# Patient Record
Sex: MALE | Race: WHITE
[De-identification: names, ages, dates, MRNs, and addresses within clinical notes are randomized per-mention and may not be internally consistent; named-entity substitution may affect disease eponyms.]

---

## 2018-10-21 ENCOUNTER — HOSPITAL ENCOUNTER (EMERGENCY)
Dept: HOSPITAL 8 - ED | Age: 23
Discharge: HOME | End: 2018-10-21
Payer: COMMERCIAL

## 2018-10-21 VITALS — HEIGHT: 71 IN | WEIGHT: 141.76 LBS | BODY MASS INDEX: 19.85 KG/M2

## 2018-10-21 VITALS — DIASTOLIC BLOOD PRESSURE: 59 MMHG | SYSTOLIC BLOOD PRESSURE: 118 MMHG
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
## 2018-10-21 DIAGNOSIS — S01.112A: Primary | ICD-10-CM
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
## 2018-10-21 PROCEDURE — 70486 CT MAXILLOFACIAL W/O DYE: CPT

## 2018-10-21 PROCEDURE — 99284 EMERGENCY DEPT VISIT MOD MDM: CPT

## 2018-10-21 PROCEDURE — 70450 CT HEAD/BRAIN W/O DYE: CPT

## 2018-10-21 PROCEDURE — 12051 INTMD RPR FACE/MM 2.5 CM/<: CPT

## 2018-10-30 ENCOUNTER — HOSPITAL ENCOUNTER (EMERGENCY)
Dept: HOSPITAL 8 - ED | Age: 23
Discharge: HOME | End: 2018-10-30
Payer: COMMERCIAL

## 2018-10-30 VITALS — DIASTOLIC BLOOD PRESSURE: 73 MMHG | SYSTOLIC BLOOD PRESSURE: 115 MMHG

## 2018-10-30 VITALS — WEIGHT: 148.59 LBS | HEIGHT: 71 IN | BODY MASS INDEX: 20.8 KG/M2

## 2018-10-30 DIAGNOSIS — S01.112D: Primary | ICD-10-CM

## 2018-10-30 PROCEDURE — 99281 EMR DPT VST MAYX REQ PHY/QHP: CPT

## 2021-10-23 ENCOUNTER — HOSPITAL ENCOUNTER (EMERGENCY)
Facility: MEDICAL CENTER | Age: 26
End: 2021-10-23
Attending: EMERGENCY MEDICINE

## 2021-10-23 ENCOUNTER — APPOINTMENT (OUTPATIENT)
Dept: RADIOLOGY | Facility: MEDICAL CENTER | Age: 26
End: 2021-10-23
Attending: EMERGENCY MEDICINE

## 2021-10-23 VITALS
TEMPERATURE: 97 F | HEART RATE: 99 BPM | BODY MASS INDEX: 20.34 KG/M2 | HEIGHT: 71 IN | SYSTOLIC BLOOD PRESSURE: 117 MMHG | RESPIRATION RATE: 17 BRPM | WEIGHT: 145.28 LBS | DIASTOLIC BLOOD PRESSURE: 86 MMHG | OXYGEN SATURATION: 97 %

## 2021-10-23 DIAGNOSIS — S63.633A SPRAIN OF INTERPHALANGEAL JOINT OF LEFT MIDDLE FINGER, INITIAL ENCOUNTER: ICD-10-CM

## 2021-10-23 PROCEDURE — 99283 EMERGENCY DEPT VISIT LOW MDM: CPT

## 2021-10-23 PROCEDURE — 73130 X-RAY EXAM OF HAND: CPT | Mod: LT

## 2021-10-23 NOTE — ED PROVIDER NOTES
"ED Provider Note    CHIEF COMPLAINT  Chief Complaint   Patient presents with   • Digit Pain     left middle finger dislocated, pt reports \"popping it back in to place\" but feels while he was sleeping it may have redislocated       HPI  Roderick Pratt is a 26 y.o. male who presents for evaluation of a left middle finger injury.  2 days ago he fell and noted a dislocation about the PIP joint so he reduced and states that it was fine.  However last night he thinks he may have dislocated in his sleep, this morning was very swollen and deformed and painful.  No fever.  He denies any cuts or scrapes.  He has no other complaints    REVIEW OF SYSTEMS  Negative for fever, weakness, numbness    PAST MEDICAL HISTORY       SOCIAL HISTORY  Social History     Tobacco Use   • Smoking status: Current Some Day Smoker     Types: Cigarettes   • Tobacco comment: a pack per pay period   Vaping Use   • Vaping Use: Never used   Substance and Sexual Activity   • Alcohol use: Yes     Comment: occ   • Drug use: Not Currently   • Sexual activity: Not on file       SURGICAL HISTORY  patient denies any surgical history    CURRENT MEDICATIONS  I personally reviewed the medication list in the charting documentation.     ALLERGIES  No Known Allergies    PHYSICAL EXAM  VITAL SIGNS: /90   Pulse (!) 116   Temp 36.4 °C (97.6 °F) (Temporal)   Resp 16   Ht 1.803 m (5' 11\")   Wt 65.9 kg (145 lb 4.5 oz)   SpO2 96%   BMI 20.26 kg/m²   Constitutional: Well appearing patient in no acute distress.  Awake and alert, not toxic nor ill in appearance.  HENT: Normocephalic, no obvious evidence of acute trauma.   Neck: Comfortable movement without any obvious restriction in the range of motion.  Eyes: Conjunctiva normal, Non-icteric.   Chest: Normal nonlabored respirations.  Skin: The exposed portions of skin reveal no obvious rash or other abnormalities.  Musculoskeletal: Inspection of the left hand reveals obvious edema surrounding the PIP joint of the " middle finger.  Held in a flexed position, normal sensation distally.  Skin intact, no redness  Neurologic: Alert, No obvious focal deficits noted.   Psychiatric: Affect normal for clinical presentation    DIAGNOSTIC STUDIES / PROCEDURES    RADIOLOGY  DX-HAND 3+ LEFT   Final Result      No acute fracture or significant arthropathy            COURSE & MEDICAL DECISION MAKING  Pertinent Labs & Imaging studies reviewed. (See chart for details)    Encounter Summary: This is a very pleasant 26 y.o. male who unfortunately required evaluation in the emergency department today with middle finger injury of his left hand, it sounds that we initially dislocated a couple days ago, he relocated himself and it was fine until he woke up this morning with a swollen third PIP joint.  Neurovascular intact.  We will x-ray.  Of note his skin is intact, this seems traumatic in nature and not infection ------ x-ray is actually reassuring.  No dislocation or fracture.  Upon reevaluation the tendon repair exam and there is really no indication of infection, the skin is fully intact with no lesions whatsoever.  At this point he will be splinted.  He will follow-up with orthopedic surgery early next week if needed, he has been given strict return instructions and at this time is discharged home in stable condition      DISPOSITION: Discharge Home      FINAL IMPRESSION  1. Sprain of interphalangeal joint of left middle finger, initial encounter        This dictation was created using voice recognition software. The accuracy of the dictation is limited to the abilities of the software. I expect there may be some errors of grammar and possibly content. The nursing notes were reviewed and certain aspects of this information were incorporated into this note.    Electronically signed by: Darío Castro M.D., 10/23/2021 1:24 PM

## 2021-10-23 NOTE — ED TRIAGE NOTES
"Vitals:    10/23/21 1014   BP: 126/90   Pulse: (!) 116   Resp: 16   Temp: 36.4 °C (97.6 °F)   SpO2: 96%     Chief Complaint   Patient presents with   • Digit Pain     left middle finger dislocated, pt reports \"popping it back in to place\" but feels while he was sleeping it may have redislocated     Finger is red and swollen. Pt reports pain is a 4 out of 10.     Pt ambulatory to and from triage.   "

## 2021-10-23 NOTE — ED NOTES
Received report from KARTIK Aldridge. Patient currently in room waiting for X-ray. Pain is manageable per patient.

## 2021-10-23 NOTE — ED NOTES
Discharge teaching and paperwork provided regarding finger injury and all questions/concerns answered. VSS, finger assessment stable. Given information regarding home care and reasons to follow up with ED or primary MD. Patient provided Ortho referral. Patient discharged to the care of himself and ambulated out of the ED.

## 2024-06-07 ENCOUNTER — OFFICE VISIT (OUTPATIENT)
Dept: URGENT CARE | Facility: CLINIC | Age: 29
End: 2024-06-07

## 2024-06-07 ENCOUNTER — NON-PROVIDER VISIT (OUTPATIENT)
Dept: OCCUPATIONAL MEDICINE | Facility: CLINIC | Age: 29
End: 2024-06-07

## 2024-06-07 ENCOUNTER — HOSPITAL ENCOUNTER (OUTPATIENT)
Facility: MEDICAL CENTER | Age: 29
End: 2024-06-07
Attending: FAMILY MEDICINE
Payer: COMMERCIAL

## 2024-06-07 VITALS
SYSTOLIC BLOOD PRESSURE: 128 MMHG | WEIGHT: 149.8 LBS | HEART RATE: 79 BPM | OXYGEN SATURATION: 98 % | BODY MASS INDEX: 20.97 KG/M2 | DIASTOLIC BLOOD PRESSURE: 70 MMHG | HEIGHT: 71 IN | TEMPERATURE: 98.9 F | RESPIRATION RATE: 16 BRPM

## 2024-06-07 DIAGNOSIS — Z02.1 PRE-EMPLOYMENT DRUG SCREENING: ICD-10-CM

## 2024-06-07 DIAGNOSIS — Z02.1 ENCOUNTER FOR PRE-EMPLOYMENT HEALTH SCREENING EXAMINATION: ICD-10-CM

## 2024-06-07 DIAGNOSIS — Z02.83 ENCOUNTER FOR DRUG SCREENING: ICD-10-CM

## 2024-06-07 LAB
AMP AMPHETAMINE: ABNORMAL
COC COCAINE: ABNORMAL
INT CON NEG: NEGATIVE
INT CON POS: POSITIVE
MET METHAMPHETAMINES: ABNORMAL
OPI OPIATES: ABNORMAL
PCP PHENCYCLIDINE: ABNORMAL
POC DRUG COMMENT 753798-OCCUPATIONAL HEALTH: POSITIVE
THC: POSITIVE

## 2024-06-07 PROCEDURE — 80305 DRUG TEST PRSMV DIR OPT OBS: CPT | Performed by: FAMILY MEDICINE

## 2024-06-07 PROCEDURE — 8911 PR MRO FEE: Performed by: NURSE PRACTITIONER

## 2024-06-07 PROCEDURE — 8915 PR COMPREHENSIVE PHYSICAL: Performed by: FAMILY MEDICINE

## 2024-06-10 LAB
GAMMA INTERFERON BACKGROUND BLD IA-ACNC: 0.04 IU/ML
M TB IFN-G BLD-IMP: NEGATIVE
M TB IFN-G CD4+ BCKGRND COR BLD-ACNC: 0.23 IU/ML
MITOGEN IGNF BCKGRD COR BLD-ACNC: >10 IU/ML
QFT TB2 - NIL TBQ2: 0.24 IU/ML